# Patient Record
Sex: FEMALE | Race: BLACK OR AFRICAN AMERICAN
[De-identification: names, ages, dates, MRNs, and addresses within clinical notes are randomized per-mention and may not be internally consistent; named-entity substitution may affect disease eponyms.]

---

## 2017-05-23 ENCOUNTER — HOSPITAL ENCOUNTER (EMERGENCY)
Dept: HOSPITAL 62 - ER | Age: 37
Discharge: HOME | End: 2017-05-23
Payer: SELF-PAY

## 2017-05-23 VITALS — SYSTOLIC BLOOD PRESSURE: 126 MMHG | DIASTOLIC BLOOD PRESSURE: 67 MMHG

## 2017-05-23 DIAGNOSIS — R20.2: ICD-10-CM

## 2017-05-23 DIAGNOSIS — R51: ICD-10-CM

## 2017-05-23 DIAGNOSIS — R11.2: Primary | ICD-10-CM

## 2017-05-23 DIAGNOSIS — R20.0: ICD-10-CM

## 2017-05-23 DIAGNOSIS — F17.200: ICD-10-CM

## 2017-05-23 DIAGNOSIS — J45.909: ICD-10-CM

## 2017-05-23 DIAGNOSIS — M54.5: ICD-10-CM

## 2017-05-23 DIAGNOSIS — G89.29: ICD-10-CM

## 2017-05-23 DIAGNOSIS — R09.81: ICD-10-CM

## 2017-05-23 LAB
APPEARANCE UR: (no result)
BILIRUB UR QL STRIP: NEGATIVE
GLUCOSE UR STRIP-MCNC: NEGATIVE MG/DL
KETONES UR STRIP-MCNC: NEGATIVE MG/DL
NITRITE UR QL STRIP: NEGATIVE
PH UR STRIP: 8 [PH] (ref 5–9)
PROT UR STRIP-MCNC: NEGATIVE MG/DL
SP GR UR STRIP: 1.01
UROBILINOGEN UR-MCNC: NEGATIVE MG/DL (ref ?–2)

## 2017-05-23 PROCEDURE — S0119 ONDANSETRON 4 MG: HCPCS

## 2017-05-23 PROCEDURE — 99284 EMERGENCY DEPT VISIT MOD MDM: CPT

## 2017-05-23 PROCEDURE — 81001 URINALYSIS AUTO W/SCOPE: CPT

## 2017-05-23 PROCEDURE — 81025 URINE PREGNANCY TEST: CPT

## 2017-05-23 NOTE — ER DOCUMENT REPORT
ED General





- General


Mode of Arrival: Ambulatory


Information source: Patient


TRAVEL OUTSIDE OF THE [place] IN LAST 30 DAYS: No





- HPI


Onset: Other - Refer to HPI notes





- General


Chief Complaint: Nausea/Vomiting


Stated Complaint: BACK/HEAD PAIN


Notes: 


Patient is a 36 year old female presenting to the ED for nausea and headache. 

Patient states she woke up with these symptoms today. Patient complains of a 

headache on the left side of her head. Patient states she also has some runny 

nose, itchy eyes, congestion all of which are worse at night.  Patient has 

these symptoms recurrently each year consistent with seasonal allergies. 

Patient also complains of some nausea with vomiting but states she has not 

really vomited anything since she has not eaten today.  Patient states she has 

some numbness/tingling to the entirety of both legs. Patient also has a 

herniated disc and chronic back pain since 2009. Patient states her back pain 

has been worsening each year since her accident in 2009. Patient denies any 

vaginal, urinary, or bowel symptoms. Patient states she does not take anything 

for back pain. Patient has no known drug allergies. (JOHN ROBERTS)





- Related Data


Allergies/Adverse Reactions: 


 





No Known Drug Allergies Allergy (Verified 05/23/17 11:36)


 











Past Medical History





- General


Information source: Patient





- Social History


Smoking Status: Current Every Day Smoker


Chew tobacco use (# tins/day): No


Frequency of alcohol use: None


Drug Abuse: None


Family History: None


Patient has suicidal ideation: No


Patient has homicidal ideation: No


Pulmonary Medical History: Reports: Hx Asthma


GI Medical History: Reports: Hx Gastroesophageal Reflux Disease


Psychiatric Medical History: Reports: Hx Bipolar Disorder


Past Surgical History: Reports: Hx Orthopedic Surgery





- Immunizations


Immunizations up to date: No


Hx Diphtheria, Pertussis, Tetanus Vaccination: No - unknown last tetanus vaccine





Review of Systems





- Review of Systems


Constitutional: No symptoms reported


EENT: See HPI, Nose congestion, Nose discharge


Cardiovascular: No symptoms reported


Respiratory: No symptoms reported


Gastrointestinal: See HPI, Nausea, Vomiting


Genitourinary: No symptoms reported


Female Genitourinary: No symptoms reported


Musculoskeletal: See HPI, Back pain


Skin: No symptoms reported


Hematologic/Lymphatic: No symptoms reported


Neurological/Psychological: See HPI, Headaches


-: Yes All other systems reviewed and negative





Physical Exam





- Vital signs


Vitals: 


 











Temp Pulse Resp BP Pulse Ox


 


 98.2 F   73   16   118/70   100 


 


 05/23/17 11:40  05/23/17 11:40  05/23/17 11:40  05/23/17 11:40  05/23/17 11:40














- Notes


Notes: 


GENERAL: Alert, interacts well. No acute distress.


HEAD: Normocephalic, atraumatic.


EYES: Pupils equal, round, and reactive to light. Extraocular movements intact.


ENT: Oral mucosa moist, tongue midline. 


NECK: Full range of motion. Supple. Trachea midline.


LUNGS: Clear to auscultation bilaterally, no wheezes, rales, or rhonchi. No 

respiratory distress.


HEART: Regular rate and rhythm. No murmurs, gallops, or rubs.


ABDOMEN: Soft, non-tender. Non-distended. Bowel sounds present in all 4 

quadrants.


EXTREMITIES: Moves all 4 extremities spontaneously.  Normal straight leg test 

bilaterally.  Good strength and ROM bilaterally. No edema, radial and dorsalis 

pedis pulses 2/4 bilaterally. No cyanosis.


NEUROLOGICAL: Alert and oriented x3. Normal speech. 


PSYCH: Normal affect, normal mood.


SKIN: Warm, dry, normal turgor. No rashes or lesions noted.


 (JOHN ROBERTS)





Course





- Re-evaluation


Re-evalutation: 


05/23/17 13:46


UA shows small leukocyte esterase, but 16 squamous epithelial cells, likely a 

contaminated specimen.  This will be sent for culture, but not treated given 

minimal symptoms and we will call if the culture grows out true infection 

rather than contamination.





Patient's symptoms are all chronic with the exception new mild nausea without 

any true vomiting but only dry heaving.  Symptoms have resolved with Zofran.  

No evidence of true infection.  Patient will be discharged to home, encouraged 

to follow-up with primary care for provider as an outpatient.  Patient 

acknowledges that all of these other symptoms are old and has been going on for 

several months now.  Recommend patient take ibuprofen and acetaminophen.  Follow

-up with primary care provider, consider physical therapy, referred to Dr. Vasquez.





05/23/17 13:49


Recommended patient use antihistamine such as Claritin or Zyrtec also 

recommended the patient used nasal steroids.  Patient agrees to try the 

Claritin or Zyrtec, refuses to try steroids as she does not like spraying stuff 

up her nose.





05/23/17 20:43


 (RAY CROCKETT)





- Vital Signs


Vital signs: 


 











Temp Pulse Resp BP Pulse Ox


 


 98.1 F   62   18   126/67 H  100 


 


 05/23/17 14:01  05/23/17 14:01  05/23/17 14:01  05/23/17 14:01  05/23/17 14:01














- Laboratory


Laboratory results interpreted by me: 


 











  05/23/17





  12:41


 


Ur Leukocyte Esterase  SMALL H


 


Urine Ascorbic Acid  40 H














Discharge





- Discharge


Clinical Impression: 


 Nausea





Chronic low back pain


Qualifiers:


 Back pain laterality: midline Sciatica presence: without sciatica Qualified 

Code(s): M54.5 - Low back pain





Allergic rhinitis


Qualifiers:


 Allergic rhinitis trigger: pollen Allergic rhinitis seasonality: seasonal 

Qualified Code(s): J30.1 - Allergic rhinitis due to pollen





Condition: Stable


Disposition: HOME, SELF-CARE


Instructions:  Chronic Back Pain ([facility]), Stretching Exercises for the Back ([facility])

, Hay Fever ([facility])


Prescriptions: 


Loratadine [Claritin] 10 mg PO DAILY #30 tablet


Forms:  Return to Work


Scribe Attestation: 





05/23/17 20:43


I personally performed the services described in the documentation, reviewed 

and edited the documentation which was dictated to the scribe in my presence, 

and it accurately records my words and actions. (RAY CROCKETT)





Scribe Documentation





- Scribe


Written by Sandra:: Sandra Bhatia, 5/23/2017 1:00


acting as scribe for :: Donaldo

## 2018-05-23 ENCOUNTER — HOSPITAL ENCOUNTER (EMERGENCY)
Dept: HOSPITAL 62 - ER | Age: 38
Discharge: HOME | End: 2018-05-23
Payer: SELF-PAY

## 2018-05-23 VITALS — DIASTOLIC BLOOD PRESSURE: 54 MMHG | SYSTOLIC BLOOD PRESSURE: 102 MMHG

## 2018-05-23 DIAGNOSIS — J06.9: Primary | ICD-10-CM

## 2018-05-23 DIAGNOSIS — B97.89: ICD-10-CM

## 2018-05-23 DIAGNOSIS — F17.210: ICD-10-CM

## 2018-05-23 DIAGNOSIS — R09.89: ICD-10-CM

## 2018-05-23 DIAGNOSIS — J45.909: ICD-10-CM

## 2018-05-23 DIAGNOSIS — R05: ICD-10-CM

## 2018-05-23 DIAGNOSIS — J02.9: ICD-10-CM

## 2018-05-23 DIAGNOSIS — R51: ICD-10-CM

## 2018-05-23 DIAGNOSIS — R09.81: ICD-10-CM

## 2018-05-23 PROCEDURE — 99282 EMERGENCY DEPT VISIT SF MDM: CPT

## 2018-05-23 NOTE — ER DOCUMENT REPORT
ED ENT





- General


Chief Complaint: Sore Throat


Stated Complaint: SORE THROAT,COUGH,CONGESTION


Time Seen by Provider: 05/23/18 13:51


Mode of Arrival: Ambulatory


Information source: Patient


Notes: 





37-year-old female presents to ED for cough cold congestion sore throat 

yesterday.  She states she feels like everything is draining from her nose to 

her chest.  She states she has a history of asthma and she is over she might 

get a pneumonia.


TRAVEL OUTSIDE OF THE [place] IN LAST 30 DAYS: No





- HPI


Patient complains to provider of: Nose problem, Throat problem


Onset: Yesterday


Onset/Duration: Gradual


Quality of pain: Achy


Pain Level: 4


Context: Recent Illness


Location of pain: Nose, Sinus, Throat


Associated symptoms: Congestion, Cough, Runny nose, Sinus pain, Sinus drainage, 

Sore throat.  denies: Fever


Similar symptoms previously: Yes


Recently seen / treated by doctor: No





- Related Data


Allergies/Adverse Reactions: 


 





No Known Drug Allergies Allergy (Verified 05/23/17 11:36)


 











Past Medical History





- General


Information source: Patient





- Social History


Smoking Status: Current Some Day Smoker


Cigarette use (# per day): Yes


Chew tobacco use (# tins/day): No


Smoking Education Provided: Yes - 4 min


Frequency of alcohol use: Occasional


Drug Abuse: None


Occupation: Industry


Family History: None


Patient has suicidal ideation: No


Patient has homicidal ideation: No





- Past Medical History


Cardiac Medical History: Reports: None


Pulmonary Medical History: Reports: Hx Asthma


EENT Medical History: Reports: None


Neurological Medical History: Reports: None


Endocrine Medical History: Reports: None


Renal/ Medical History: Reports: None


Malignancy Medical History: Reports: None


GI Medical History: Reports: Hx Gastroesophageal Reflux Disease


Musculoskeltal Medical History: Reports Hx Musculoskeletal Trauma


Skin Medical History: Reports None


Psychiatric Medical History: Reports: Hx Bipolar Disorder


Traumatic Medical History: Reports: Hx Fractures - Jaw


Infectious Medical History: Reports: None


Past Surgical History: Reports: Hx Orthopedic Surgery





- Immunizations


Immunizations up to date: No


Hx Diphtheria, Pertussis, Tetanus Vaccination: No - unknown last tetanus vaccine





Review of Systems





- Review of Systems


Constitutional: Recent illness


EENT: Nose congestion, Nose discharge, Sinus pressure, Sinus discharge, Throat 

pain


Cardiovascular: No symptoms reported


Respiratory: Cough


Gastrointestinal: No symptoms reported


Genitourinary: No symptoms reported


Female Genitourinary: No symptoms reported


Musculoskeletal: No symptoms reported


Skin: No symptoms reported


Hematologic/Lymphatic: No symptoms reported


Neurological/Psychological: No symptoms reported


-: Yes All other systems reviewed and negative





Physical Exam





- Vital signs


Vitals: 





 











Temp Pulse Resp BP Pulse Ox


 


 99.1 F   93   18   102/54 L  96 


 


 05/23/18 13:04  05/23/18 13:04  05/23/18 13:04  05/23/18 13:04  05/23/18 13:04











Interpretation: Normal





- General


General appearance: Appears well, Alert





- HEENT


Head: Normocephalic, Atraumatic


Eyes: Normal


Pupils: PERRL


Ears: Normal


External canal: Normal


Tympanic membrane: Normal


Sinus: Normal


Nasal: Purulent discharge, Swelling


Mouth/Lips: Normal


Mucous membranes: Normal


Pharynx: Post nasal drainage.  No: Erythema, Exudate, Peritonsillar abscess, 

Retropharyngeal abscess, Tonsillar hypertrophy, Potential airway comprom.


Neck: Normal





- Respiratory


Respiratory status: No respiratory distress


Chest status: Nontender


Breath sounds: Nonproductive cough.  No: Productive cough, Rales, Rhonchi, 

Stridor, Wheezing


Chest palpation: Normal





- Cardiovascular


Rhythm: Regular


Heart sounds: Normal auscultation


Murmur: No





- Abdominal


Inspection: Normal


Distension: No distension


Bowel sounds: Normal


Tenderness: Nontender


Organomegaly: No organomegaly





- Back


Back: Normal, Nontender





- Extremities


General upper extremity: Normal inspection, Nontender, Normal color, Normal ROM

, Normal temperature


General lower extremity: Normal inspection, Nontender, Normal color, Normal ROM

, Normal temperature, Normal weight bearing.  No: Hilary's sign





- Neurological


Neuro grossly intact: Yes


Cognition: Normal


Orientation: AAOx4


[place] Coma Scale Eye Opening: Spontaneous


[place] Coma Scale Verbal: Oriented


[place] Coma Scale Motor: Obeys Commands


Ibis Coma Scale Total: 15


Speech: Normal


Motor strength normal: LUE, RUE, LLE, RLE


Sensory: Normal





- Psychological


Associated symptoms: Normal affect, Normal mood





- Skin


Skin Temperature: Warm


Skin Moisture: Dry


Skin Color: Normal





Course





- Re-evaluation


Re-evalutation: 





05/23/18 14:59


Patient was treated with Claritin and Sudafed and Decadron for her respiratory 

infection symptoms.  She does have a history of asthma so that is why she 

received a steroid.  She also received a prescription for albuterol.  After 

performing a Medical Screening Examination, I estimate there is LOW risk for 

ACUTE CORONARY SYNDROME, RESPIRATORY FAILURE, SEPSIS OR MENINGITIS, thus I 

consider the discharge disposition reasonable.  I have reevaluated this patient 

multiple times and no significant life threatening changes are noted. The 

patient and I have discussed the diagnosis and risks, and we agree with 

discharging home with close follow-up. We also discussed returning to the 

Emergency Department immediately if new or worsening symptoms occur. We have 

discussed the symptoms which are most concerning (e.g., changing or worsening 

pain, trouble swallowing or breathing, neck stiffness, fever) that necessitate 

immediate return.





- Vital Signs


Vital signs: 





 











Temp Pulse Resp BP Pulse Ox


 


 99.1 F   93   18   102/54 L  96 


 


 05/23/18 13:04  05/23/18 13:04  05/23/18 13:04  05/23/18 13:04  05/23/18 13:04














Discharge





- Discharge


Clinical Impression: 


 Viral sore throat





URI (upper respiratory infection)


Qualifiers:


 URI type: unspecified URI Qualified Code(s): J06.9 - Acute upper respiratory 

infection, unspecified





Condition: Stable


Disposition: HOME, SELF-CARE


Instructions:  Family Physicians / Practices


Additional Instructions: 


SORE THROAT:





     Sore throats may be caused by viruses, bacteria, or fungi.  Most are due 

to a virus, and must get better on their own.  Bacterial sore throats, 

particularly those due to "strep," need treatment with antibiotics.


     If an antibiotic is prescribed, be sure to take the medication for a full 

10 days.  Failure to take the antibiotic can result in complications such as 

rheumatic fever.  Sometimes, an injection of antibiotics is given instead of 

pills or liquid.  This single "shot" is equal in effectiveness to the oral 

medication.


     To relieve symptoms, take acetaminophen for pain.  Sip clear liquids 

frequently, or eat popsicles or ice chips.  Anesthetic sprays or lozenges may 

help.  Make sure the air in the room is not too dry. Avoid using decongestants 

or antihistamines.


     Call the doctor if there is no improvement in two days, or if you have 

difficulty breathing, increasing throat pain, high fever, rash, or frequent 

vomiting.





UPPER RESPIRATORY ILLNESS:





     You have a viral infection of the respiratory passages -- a "cold."  This 

common infection causes nasal congestion, drainage, and often sore throat and 

cough.  It is highly contagious.  The disease usually lasts about 10 to 14 days.


     There is no "cure" for the viral infection -- it must run its course.  If 

there is a complication, such as bacterial infection in the nose, sinuses, 

middle ear, or bronchial tubes, antibiotics may be required.  The antibiotics 

won't affect the virus.


     Drink plenty of fluids.  A humidifier may help.  An expectorant medication 

or decongestant may make you more comfortable.  Use acetaminophen or ibuprofen 

for fever or aches.


     See the doctor if fever persists over two days, if there is any 

significant worsening of your symptoms, or if you simply fail to improve as 

expected.





DECONGESTANT MEDICATION:


     A decongestant medicine has been prescribed.  Often this medicine is 

combined in the same tablet with an antihistamine or expectorant. This type of 

medicine is helpful in treating a bad cold or sinus condition, as well as in 

treatment of the nasal congestion of hay fever.  It is not of much benefit for 

lung infections.


     Decongestant medicines are related to stimulants.  They can cause an 

increase in blood pressure and heart rate.  Persons with heart disease and high 

blood pressure should not take decongestants without discussing this with the 

physician.


     If you develop palpitations, chest pain, headache, or tremors, stop the 

medicine and consult your physician.








COUGH-SUPPRESSANT & EXPECTORANT MEDICATION:


     You are to use a cough medication as needed for relief of symptoms.  This 

medicine is a combination of an expectorant (to make the mucous thinner and 

more easily "coughed up") and a cough suppressant (to reduce the frequency of 

coughing).


     The cough-suppressant medicine is related to narcotics.  You may 

experience mild nausea and sleepiness.  Some patients who are very sensitive to 

narcotics may have stomach pain from this medicine. Taking the medicine with 

food reduces these side effects.  Do not drive or work with machinery until you 

know how this medicine affects you.


     The expectorant should have no side effects.  Iodine-containing 

expectorants (such as organidin) should not be taken by persons with active 

thyroid disease unless approved by your doctor.


     Call the doctor if you develop shortness of breath, hives, rash, itching, 

lightheadedness, or severe nausea and vomiting.








INHALED BRONCHODILATORS:


     You have received a treatment of and/or prescription for an inhaled 

bronchodilator -- a medication which stimulates the airways in the lung to 

dilate.  This improves the flow of air in asthma, bronchitis, and emphysema.


     These medicines have some similarity to adrenaline, and can cause similar 

side effects:  shakiness, racing heart, and a sense of nervousness.  These side 

effects decrease with time.  Contact your doctor if these side effects are 

severe.


     Do not over-use the medicine.  Too-frequent use of the inhaler may make it 

ineffective.  Call your doctor if the inhaler is not controlling your symptoms 

at the prescribed doses.








STEROID MEDICATION:


     You have been given an injection of or oral medicine of the cortisone/

steroid class.  This medication is used to control inflammation or allergy.  

Sai t is usually only given for a short period of time, until the acute process 

subsides.


     There are usually no side effects from short-term use of cortisone-like 

medications.  Some persons feel an increased sense of well-being and are not 

sleepy at bedtime.  Long-term use of cortisone medications is best avoided, 

unless required for a severe condition.  If your condition does not remit, or 

relapses after the course of corticosteroid medication, you should consult your 

physician.








USE OF ACETAMINOPHEN (Tylenol):


     Acetaminophen may be taken for pain relief or fever control. It's much 

safer than aspirin, offering a wider range of "safe" dosages.  It is safe 

during pregnancy.  Some brand names are Tylenol, Panadol, Datril, Anacin 3, 

Tempra, and Liquiprin. Acetaminophen can be repeated every four hours.  The 

following are maximum recommended dosages:


>89 pounds or adults          650 mg to 900 mg


Acetaminophen can be repeated every four hours.  Maximum dose not to exceed 

4000 mg a day.





SMOKING:


     If you smoke, you should stop smoking.  The tar and chemicals in cigarette 

smoke are harmful.  Smoking has been shown to cause:


          emphysema


          chronic bronchitis


          lung cancer


          mouth and throat cancer


          stomach and pancreas cancer


          premature aging


          birth defects


     In addition, smoking increases ear and lung infections in children of 

smokers.





STEROID MEDICATION:


     You have been given a medicine of the cortisone/steroid class.  This 

medication is used to control inflammation or allergy.  It is usually only 

given for a short period of time, until the acute process subsides.


     There are usually no side effects from short-term use of cortisone-like 

medications.  Some persons feel an increased sense of well-being and are not 

sleepy at bedtime.  Long-term use of cortisone medications is best avoided, 

unless required for a severe condition.  If your condition does not remit, or 

relapses after the course of corticosteroid medication, you should consult your 

physician.








FOLLOW-UP CARE:


If you have been referred to a physician for follow-up care, call the physician

s office for an appointment as you were instructed or within the next two days.

  If you experience worsening or a significant change in your symptoms, notify 

the physician immediately or return to the Emergency Department at any time for 

re-evaluation.





Prescriptions: 


Albuterol Sulfate [Proair HFA Inhalation Aerosol 8.5 gm MDI] 2 puff IH Q4H PRN #

1 mdi


 PRN Reason: 


Forms:  Smoking Cessation Education, Return to Work

## 2018-07-27 ENCOUNTER — HOSPITAL ENCOUNTER (EMERGENCY)
Dept: HOSPITAL 62 - ER | Age: 38
Discharge: HOME | End: 2018-07-27
Payer: SELF-PAY

## 2018-07-27 VITALS — DIASTOLIC BLOOD PRESSURE: 68 MMHG | SYSTOLIC BLOOD PRESSURE: 120 MMHG

## 2018-07-27 DIAGNOSIS — K08.89: ICD-10-CM

## 2018-07-27 DIAGNOSIS — J45.909: ICD-10-CM

## 2018-07-27 DIAGNOSIS — K02.9: Primary | ICD-10-CM

## 2018-07-27 DIAGNOSIS — F17.200: ICD-10-CM

## 2018-07-27 PROCEDURE — 99282 EMERGENCY DEPT VISIT SF MDM: CPT

## 2018-07-27 NOTE — ER DOCUMENT REPORT
HPI





- HPI


Patient complains to provider of: Toothache


Onset: Other - Sunday


Onset/Duration: Gradual, Persistent


Pain Level: 5


Context: 





37-year-old female complaining of upper left first bicuspid toothache since 

Sunday.  No facial swelling.


Associated Symptoms: None


Exacerbated by: Denies


Relieved by: Denies


Similar symptoms previously: Yes


Recently seen / treated by doctor: No





- ROS


ROS below otherwise negative: Yes


Systems Reviewed and Negative: Yes All other systems reviewed and negative





- REPRODUCTIVE


Reproductive: DENIES: Pregnant:





Past Medical History





- General


Information source: Patient





- Social History


Smoking Status: Current Every Day Smoker


Frequency of alcohol use: None


Drug Abuse: None


Lives with: Family


Family History: None


Pulmonary Medical History: Reports: Hx Asthma


Renal/ Medical History: Denies: Hx Peritoneal Dialysis


GI Medical History: Reports: Hx Gastroesophageal Reflux Disease


Musculoskeletal Medical History: Reports Hx Musculoskeletal Trauma


Psychiatric Medical History: Reports: Hx Bipolar Disorder


Traumatic Medical History: Reports: Hx Fractures - Jaw


Past Surgical History: Reports: Hx Orthopedic Surgery





- Immunizations


Immunizations up to date: No


Hx Diphtheria, Pertussis, Tetanus Vaccination: No - unknown last tetanus vaccine





[facility] Provider Document





- CONSTITUTIONAL


Agree With Documented VS: Yes


Exam Limitations: No Limitations





- INFECTION CONTROL


TRAVEL OUTSIDE OF THE [place] IN LAST 30 DAYS: No





- HEENT


Notes: 





Dental decay with retracted gingiva to the first bicuspid upper left.  The 

other bicuspid and molars are gone on that side.  No abscess palpated.





- NECK


Neck: Supple.  negative: Lymphadenopathy-Left, Lymphadenopathy-Right





- NEURO


Level of Consciousness: Alert





Discharge





- Discharge


Clinical Impression: 


 Dental pain and decay





Condition: Good


Disposition: HOME, SELF-CARE


Instructions:  Acetaminophen, [facility], Ibuprofen (General) ([facility])

, Penicillin V K ([facility]), Toothache ([facility])


Additional Instructions: 


Lidocaine to numb the area


Penicillin to kill the bacteria in the mouth


Tylenol up to 4000 mg per day for pain


Ibuprofen 400 mg every 6 hours for pain


See the dentist


Prescriptions: 


Ibuprofen [Motrin 400 mg Tablet] 400 mg PO Q6HP PRN #30 tablet


 PRN Reason: 


Penicillin V Potassium [Penicillin Vk 500 mg Tablet] 500 mg PO QID #40 tablet

## 2019-03-01 ENCOUNTER — HOSPITAL ENCOUNTER (EMERGENCY)
Dept: HOSPITAL 62 - ER | Age: 39
Discharge: HOME | End: 2019-03-01
Payer: SELF-PAY

## 2019-03-01 VITALS — DIASTOLIC BLOOD PRESSURE: 65 MMHG | SYSTOLIC BLOOD PRESSURE: 126 MMHG

## 2019-03-01 DIAGNOSIS — R09.81: ICD-10-CM

## 2019-03-01 DIAGNOSIS — R06.7: ICD-10-CM

## 2019-03-01 DIAGNOSIS — R09.89: ICD-10-CM

## 2019-03-01 DIAGNOSIS — J45.901: ICD-10-CM

## 2019-03-01 DIAGNOSIS — R05: ICD-10-CM

## 2019-03-01 DIAGNOSIS — J06.9: Primary | ICD-10-CM

## 2019-03-01 DIAGNOSIS — F17.200: ICD-10-CM

## 2019-03-01 PROCEDURE — 94640 AIRWAY INHALATION TREATMENT: CPT

## 2019-03-01 PROCEDURE — 99283 EMERGENCY DEPT VISIT LOW MDM: CPT

## 2019-03-01 NOTE — ER DOCUMENT REPORT
ED General





- General


Chief Complaint: Cold Symptoms


Stated Complaint: COLD SYMPTOMS


Time Seen by Provider: 03/01/19 21:05


Mode of Arrival: Ambulatory


Information source: Patient


TRAVEL OUTSIDE OF THE [place] IN LAST 30 DAYS: No





- HPI


Patient complains to provider of: Bad cold, nasal congestion, runny nose, chest 

congestion, wheezing, coughin


Onset: Other - 2-3 days


Onset/Duration: Gradual, Persistent


Quality of pain: No pain


Severity: None


Associated symptoms: Nonproductive cough, Rhinnorhea.  denies: Chills, Diarrhea,

Fever, Nausea, Vomiting, Sore throat


Exacerbated by: Denies


Relieved by: Denies


Similar symptoms previously: No


Recently seen / treated by doctor: No


Notes: 





2-year-old -American female with presentation for bad cold including 

nasal congestion, runny nose, chest congestion, wheezing and coughing and 

sneezing.  Symptoms started a couple of days ago.  Patient is a smoker and is 

asthmatic.  Wheezing is worse.  No nausea vomiting or diarrhea





- Related Data


Allergies/Adverse Reactions: 


                                        





No Known Drug Allergies Allergy (Verified 05/23/17 11:36)


   











Past Medical History





- General


Information source: Patient





- Social History


Smoking Status: Current Every Day Smoker


Chew tobacco use (# tins/day): No


Frequency of alcohol use: None


Drug Abuse: None


Family History: None, Reviewed & Not Pertinent


Patient has suicidal ideation: No


Patient has homicidal ideation: No


Pulmonary Medical History: Reports: Hx Asthma


Renal/ Medical History: Denies: Hx Peritoneal Dialysis


GI Medical History: Reports: Hx Gastroesophageal Reflux Disease


Musculoskeletal Medical History: Reports Hx Musculoskeletal Trauma


Psychiatric Medical History: Reports: Hx Bipolar Disorder


Traumatic Medical History: Reports: Hx Fractures - Jaw


Past Surgical History: Reports: Hx Orthopedic Surgery





- Immunizations


Immunizations up to date: No


Hx Diphtheria, Pertussis, Tetanus Vaccination: No - unknown last tetanus vaccine





Review of Systems





- Review of Systems


Notes: 





Constitutional:  No fevers. No chills.





EENT: No eye redness. No eye pain. No ear pain. No sore throat.  Nasal and sinus

congestion





Cardiovascular:  No chest pain. No palpitations.





Respiratory: Harsh, dry cough. No shortness of breath. No respiratory distress. 

Positive wheezing





Gastrointestinal: No abdominal pain. No nausea, vomiting, or diarrhea.





Genitourinary: Atraumatic. No lesions. No pain. No discharge.





Musculoskeletal: Atraumatic. No swelling. No deformities.





Skin: No rash or lesions.





Lymphatic: No swollen lymph nodes.





Neurologic: No headache. No syncope.





Psychiatric: No suicidal or homicidal ideation.





Physical Exam





- Vital signs


Vitals: 





                                        











Temp Pulse Resp BP Pulse Ox


 


 99.3 F   82   16   130/79 H  100 


 


 03/01/19 19:28  03/01/19 19:28  03/01/19 19:28  03/01/19 19:28  03/01/19 19:28














- Notes


Notes: 





General: Well-developed, well-nourished. In no acute distress. Non-toxic 

appearing.





Cardiac: Well-perfused. Regular rate and rhythm. No murmurs, rubs, or gallops. 





Pulmonary: Bilateral wheezing.  No respiratory distress.





Abdominal: Non-distended. Non-rigid. Bowels sounds are present in all four 

quadrants. No guarding or rebound.





HEENT: Head is atraumatic. Conjunctivae not reddened. No tearing. PERRL. EOMI. 

Orbits atraumatic. No periorbital swelling or erythema. Oropharynx is without 

erythema, swelling, or exudates.





Neck: Supple. No adenopathy. No meningismus.





Dermatologic: Warm with good turgor. No rash. Atraumatic.





Chest: Atraumatic. No chest wall tenderness to palpation.





Musculoskeletal: Moves all extremities well. No range of motion deficits. no 

muscular or joint tenderness. No paraspinal muscle tenderness. no midline spinal

tenderness or step-off.





Genitourinary: Examination deferred





Neurologic: No gross neurologic deficits.





Psychiatric: Normal mood. 











Course





- Re-evaluation


Re-evalutation: 





03/01/19 22:08


Patient has improved after 3 consecutive DuoNeb's.  We will discharge her home 

with upper respiratory and asthma flare.





- Vital Signs


Vital signs: 





                                        











Temp Pulse Resp BP Pulse Ox


 


 99.3 F   82   16   130/79 H  100 


 


 03/01/19 19:28  03/01/19 19:28  03/01/19 19:28  03/01/19 19:28  03/01/19 19:28














Discharge





- Discharge


Clinical Impression: 


Upper respiratory infection


Qualifiers:


 URI type: unspecified URI Qualified Code(s): J06.9 - Acute upper respiratory 

infection, unspecified





Asthma exacerbation


Qualifiers:


 Asthma severity: unspecified severity Asthma persistence: unspecified Qualified

Code(s): J45.901 - Unspecified asthma with (acute) exacerbation





Condition: Good


Disposition: HOME, SELF-CARE


Instructions:  Upper Respiratory Illness ([facility]), Asthma ([facility])


Prescriptions: 


D-Methorphan Hb/P-Epd HCl/Bpm [Bromfed-DM Cough Syrup] 5 ml PO Q4HP PRN #150 ml


 PRN Reason: 


Albuterol Sulfate [Proair HFA Inhalation Aerosol 8.5 gm MDI] 2 puff IH Q4H PRN 

#1 mdi


 PRN Reason: 


Prednisone [Deltasone 20 mg Tablet] 3 tab PO DAILY 5 Days #15 tablet


Forms:  Smoking Cessation Education


Referrals: 


[facility] [Provider Group] - Follow up in 3-5 days

## 2019-10-02 ENCOUNTER — HOSPITAL ENCOUNTER (EMERGENCY)
Dept: HOSPITAL 62 - ER | Age: 39
Discharge: HOME | End: 2019-10-02
Payer: SELF-PAY

## 2019-10-02 VITALS — SYSTOLIC BLOOD PRESSURE: 107 MMHG | DIASTOLIC BLOOD PRESSURE: 73 MMHG

## 2019-10-02 DIAGNOSIS — F17.200: ICD-10-CM

## 2019-10-02 DIAGNOSIS — J20.9: Primary | ICD-10-CM

## 2019-10-02 DIAGNOSIS — B96.89: ICD-10-CM

## 2019-10-02 DIAGNOSIS — J34.89: ICD-10-CM

## 2019-10-02 DIAGNOSIS — R06.7: ICD-10-CM

## 2019-10-02 DIAGNOSIS — R05: ICD-10-CM

## 2019-10-02 DIAGNOSIS — J45.901: ICD-10-CM

## 2019-10-02 PROCEDURE — 99283 EMERGENCY DEPT VISIT LOW MDM: CPT

## 2019-10-02 PROCEDURE — 94640 AIRWAY INHALATION TREATMENT: CPT

## 2019-10-02 PROCEDURE — 71046 X-RAY EXAM CHEST 2 VIEWS: CPT

## 2019-10-02 NOTE — RADIOLOGY REPORT (SQ)
EXAM DESCRIPTION:  CHEST 2 VIEWS



COMPLETED DATE/TIME:  10/2/2019 10:26 am



REASON FOR STUDY:  cough



COMPARISON:  3/17/2015



EXAM PARAMETERS:  NUMBER OF VIEWS: two views

TECHNIQUE: Digital Frontal and Lateral radiographic views of the chest acquired.

RADIATION DOSE: NA

LIMITATIONS: none



FINDINGS:  LUNGS AND PLEURA: No opacities, masses or pneumothorax. No pleural effusion.

MEDIASTINUM AND HILAR STRUCTURES: No masses or contour abnormalities.

HEART AND VASCULAR STRUCTURES: Heart normal size.  No evidence for failure.

BONES: No acute findings.

HARDWARE: None in the chest.

OTHER: No other significant finding.



IMPRESSION:  NO ACUTE RADIOGRAPHIC FINDING IN THE CHEST.



TECHNICAL DOCUMENTATION:  JOB ID:  [id number]

 2011 [employer]- All Rights Reserved



Reading location - IP/workstation name: [email or web address]

## 2019-10-02 NOTE — ER DOCUMENT REPORT
ED Respiratory Problem





- General


Chief Complaint: Cold Symptoms


Stated Complaint: COUGH


Time Seen by Provider: 10/02/19 09:04


TRAVEL OUTSIDE OF THE [place] IN LAST 30 DAYS: No





- HPI


Notes: 





This is a 38-year-old female who presents today with a complaint of cough, 

congestion, sneezing, rhinorrhea for the past 2 days.  Patient states that she 

thinks is because of the head cold and Does not wanted to get any worse.  She 

has a history of asthma which is usually triggered by changes in weather.  She 

describes her cough is productive of clear sputum.  She denies any fever or 

chills.  She denies any chest pain.  There are no obvious aggravating or 

relieving factors.





- Related Data


Allergies/Adverse Reactions: 


                                        





No Known Drug Allergies Allergy (Verified 05/23/17 11:36)


   











Past Medical History





- Social History


Smoking Status: Current Some Day Smoker


Chew tobacco use (# tins/day): No


Frequency of alcohol use: None


Drug Abuse: None


Family History: None, Reviewed & Not Pertinent


Patient has suicidal ideation: No


Patient has homicidal ideation: No


Pulmonary Medical History: Reports: Hx Asthma


Renal/ Medical History: Denies: Hx Peritoneal Dialysis


GI Medical History: Reports: Hx Gastroesophageal Reflux Disease


Musculoskeletal Medical History: Reports Hx Musculoskeletal Trauma


Psychiatric Medical History: Reports: Hx Bipolar Disorder


Traumatic Medical History: Reports: Hx Fractures - Jaw


Past Surgical History: Reports: Hx Orthopedic Surgery





- Immunizations


Immunizations up to date: No


Hx Diphtheria, Pertussis, Tetanus Vaccination: No - unknown last tetanus vaccine





Review of Systems





- Review of Systems


Constitutional: denies: Fever


EENT: Nose congestion, Sinus pressure


Cardiovascular: denies: Chest pain, Palpitations


Respiratory: Cough, Sputum, Wheezing


-: Yes All other systems reviewed and negative





Physical Exam





- Vital signs


Vitals: 


                                        











Temp Pulse Resp BP Pulse Ox


 


 99.1 F   77   16   107/73   100 


 


 10/02/19 08:23  10/02/19 08:23  10/02/19 08:23  10/02/19 08:23  10/02/19 08:23














- General


General appearance: Appears well, Alert





- Respiratory


Respiratory status: No respiratory distress


Chest status: Nontender


Breath sounds: Rhonchi, Wheezing - Scattered wheezes and rhonchi appreciated.


Chest palpation: Normal





- Cardiovascular


Rhythm: Regular


Heart sounds: Normal auscultation


Murmur: No





- Abdominal


Inspection: Normal


Distension: No distension


Bowel sounds: Normal


Tenderness: Nontender


Organomegaly: No organomegaly





- Neurological


Neuro grossly intact: Yes


Cognition: Normal


Orientation: AAOx4


[place] Coma Scale Eye Opening: Spontaneous


[place] Coma Scale Verbal: Oriented


Ibis Coma Scale Motor: Obeys Commands


Ibis Coma Scale Total: 15


Speech: Normal


Motor strength normal: LUE, RUE, LLE, RLE


Sensory: Normal





- Psychological


Associated symptoms: Normal affect, Normal mood





- Skin


Skin Temperature: Warm


Skin Moisture: Dry


Skin Color: Normal





Course





- Re-evaluation


Re-evalutation: 





10/02/19 09:31


Differential diagnosis includes bronchitis versus asthma exacerbation versus 

pneumonia.


10/02/19 10:56


Patient reevaluated.  Patient is doing well.  No wheezing.  She is stable for 

discharge.  Chest x-ray negative.  Given rhonchi on exam, I will put her on a Z-

Karthik for bronchitis.  Follow-up discussed with patient.





- Vital Signs


Vital signs: 


                                        











Temp Pulse Resp BP Pulse Ox


 


 99.1 F   77   16   107/73   100 


 


 10/02/19 08:23  10/02/19 08:23  10/02/19 08:23  10/02/19 08:23  10/02/19 08:23














- Diagnostic Test


Radiology reviewed: Reports reviewed





Discharge





- Discharge


Clinical Impression: 


 Acute bacterial bronchitis





Asthma with exacerbation


Qualifiers:


 Asthma severity: mild Asthma persistence: unspecified Qualified Code(s): 

J45.901 - Unspecified asthma with (acute) exacerbation





Condition: Good


Disposition: HOME, SELF-CARE


Instructions:  Upper Respiratory Illness ([facility]), Asthma ([facility])


Prescriptions: 


Prednisone [Deltasone 20 mg Tablet] 2 tab PO DAILY 5 Days #10 tablet


Albuterol Sulfate [Proair HFA Inhalation Aerosol 8.5 gm MDI] 2 puff IH Q4H PRN 

#1 mdi


 PRN Reason: 


Azithromycin [Zithromax 250 mg Tablet] 250 mg PO ASDIR PRN #6 tablet


 PRN Reason: 


Forms:  Return to Work


Referrals: 


[facility] [NO LOCAL MD] - Follow up as needed

## 2020-03-28 ENCOUNTER — HOSPITAL ENCOUNTER (OUTPATIENT)
Dept: HOSPITAL 62 - RDC | Age: 40
End: 2020-03-28
Attending: NURSE PRACTITIONER
Payer: SELF-PAY

## 2020-03-28 VITALS — SYSTOLIC BLOOD PRESSURE: 121 MMHG | DIASTOLIC BLOOD PRESSURE: 61 MMHG

## 2020-03-28 DIAGNOSIS — Z20.828: Primary | ICD-10-CM

## 2020-03-28 LAB
A TYPE INFLUENZA AG: POSITIVE
B INFLUENZA AG: NEGATIVE

## 2020-03-28 PROCEDURE — 87804 INFLUENZA ASSAY W/OPTIC: CPT

## 2020-03-28 PROCEDURE — 87070 CULTURE OTHR SPECIMN AEROBIC: CPT

## 2020-03-28 PROCEDURE — 87880 STREP A ASSAY W/OPTIC: CPT

## 2020-03-28 NOTE — ER RDC ASSESSMENT REPORT
Intake





- In the Last 14 days


Have you traveled outside [place]?: No


Have you been in close contact with someone CONFIRMED: No


Worked in Healthcare?: No





- Symptoms


Subjective Fever(Felt feverish): No


Chills: No


Muscule Aches: No


Runny Nose: Yes


Sore Throat: No


Cough (New or worsening chronic cough): Yes


Shortness of breath: Yes


Nausea or Vomiting: No


Headache: Yes


Abdominal Pain: No


Diarrhea(3 or more loose stools in last 24 hours): No





- Do you have any of the following


Chronic lung disease: Asthma or emphysema or COPD: Yes


Chronic Lung Disease Comment: 


Asthma


Cystic Fibrosis: No


Diabetes: No


High Blood Pressure: No


Cardiovascular Disease: No


Chronic Kidney Disease: No


Chronic Liver Disease: No


Chronic blood disorder like Sickle Cell Disease: No


Weak immune system due to disease or medication: No


Neurologic condition that limits movement: No


Developmental delay - Moderate to Severe: No


Recent (within past 2 weeks) or current Pregnancy: No





- Objective


Temperature: 98.8 F


Pulse Rate: 97


Respiratory Rate: 16


Blood Pressure: 121/61


O2 Sat by Pulse Oximetry: 96


Objective: 


Patient presents for COVID testing.





General





- General


Stated Complaint: Patient reports upper respiratory symptoms that began 

03/23/2020


Mode of Arrival: Ambulatory


Information source: Patient





- HPI


Onset: This morning





- Related Data


Allergies/Adverse Reactions: 


                                        





No Known Drug Allergies Allergy (Verified 05/23/17 11:36)


   











Past Medical History





- Social History


Smoking Status: Current Every Day Smoker


Smoking Education Provided: Yes - Education on smoking cessation provided


Family History: None, Reviewed & Not Pertinent


Pulmonary Medical History: Reports: Hx Asthma


Renal/ Medical History: Denies: Hx Peritoneal Dialysis


GI Medical History: Reports: Hx Gastroesophageal Reflux Disease


Musculoskeletal Medical History: Reports Hx Musculoskeletal Trauma


Psychiatric Medical History: Reports: Hx Bipolar Disorder


Traumatic Medical History: Reports: Hx Fractures - Jaw


Past Surgical History: Reports: Hx Orthopedic Surgery





Physical Exam





- General


Notes: 


PHYSICAL EXAMINATION: 


GENERAL: Well-appearing and in no acute distress. 


HEAD: Atraumatic, normocephalic. 


EYES: sclera anicteric, conjunctiva are normal. 


ENT: nares patent. Moist mucous membranes. 


NECK: Normal range of motion, supple without lymphadenopathy 


LUNGS: CTAB and equal. No wheezes rales or rhonchi. 


HEART: Regular rate and rhythm without murmurs 


NEUROLOGICAL: Normal speech.


PSYCH: Normal mood, normal affect. 


SKIN: Warm, Dry, normal turgor, no obvious rashes or lesions noted








Diagnostic Results


Laboratory Results: 


Patient notified of POSITIVE result for Influenza A. 


Also notified of NEGATIVE result for Influenza B and Rapid Strep testing, as 

well as PENDING results for Strep Culture. 





Patient Education/Counseling


Counseling/Education: 


Patient presents with upper respiratory symptoms worrisome for possible Covid 

19.  Patient does not have emergency worring symptoms such as difficulty 

breathing, shortness of breath, chest pain, pressure, confusion or cyanosis.  

Patient appears suitable for discharge.  Patient's vital signs are stable and 

patient is nontoxic in appearance.  Good return precautions have been discussed 

with patient, patient verbalized understanding and is agreeable with discharge 

plan of care at this time.





Patient provided with COVID discharge instructions including:





As a person under investigation for Covid 19, the [facility] of 

Health and Human Services, division of public health advises you to adhere to 

the following guidance until your test results are reported to you.  If your 

test result is positive, you will receive additional information from your 

provider and your local health department at that time.


 


Remain at home until you are cleared by the health provider or public health 

authorities.


 


Keep a log of visitors to your home, notify any visitors to your home of your 

isolation status.


 


If you plan to move to a new address or leave the county, notify the local 

health department in your County.


 


Call your doctor or seek care if you have an urgent medical need.  Before 

seeking medical care, call ahead to get instructions from the provider before 

arriving at the medical office clinic or hospital.  Notify them that you are 

being tested for the virus that causes Covid 19 so that arrangements can be 

made, as necessary, to prevent transmission to others in the healthcare setting.

 Next, notify the local health department in your county.


 


If a medical emergency arises and you need to call 911, inform the first 

responders that you are being tested for the virus that causes Covid 19.  Next, 

notify the local health department in your county.





Patient advised due to onset of symptoms greater than 72 hours, symptomatic 

treatment includes the following:


- Use OTC acetaminophen (follow directions by ) for elevated 

temperature above 101 degrees


- Increase fluid intake to promote hydration


- Consume healthy diet as tolerated


- Activity as tolerated with periods of rest as necessary


- Encouraged smoking cessation





Guidance for worsening S/SX: 


For increased or worsening symptoms patient was instructed to contact her 

primary care provider.  In the presence of life-threatening symptoms, patient 

instructed to immediately call 911, and go to the closest Emergency Department.





RDC Discharge





- Discharge


Clinical Impression: 


 Influenza A, COVID 19 Screening





Condition: Stable


Disposition: Home; Selfcare

## 2020-04-28 ENCOUNTER — HOSPITAL ENCOUNTER (EMERGENCY)
Dept: HOSPITAL 62 - ER | Age: 40
Discharge: HOME | End: 2020-04-28
Payer: SELF-PAY

## 2020-04-28 VITALS — DIASTOLIC BLOOD PRESSURE: 65 MMHG | SYSTOLIC BLOOD PRESSURE: 121 MMHG

## 2020-04-28 DIAGNOSIS — K08.9: Primary | ICD-10-CM

## 2020-04-28 PROCEDURE — 99282 EMERGENCY DEPT VISIT SF MDM: CPT

## 2020-04-28 NOTE — ER DOCUMENT REPORT
HPI





- HPI


Patient complains to provider of: dental pain


Time Seen by Provider: 04/28/20 10:32


Onset: Last week


Onset/Duration: Sudden


Quality of pain: Achy


Pain Level: 3


Context: 





39-year-old female presents emergency department with complaints of right upper 

dental pain radiating to her ear to her jaw to the back of her neck.  She denies

fever vomiting diarrhea.  Denies sensitivity to heat or cold.  Reports symptoms 

for the past week.  Patient reports she is taken a Tylenol Sinus without relief 

of symptoms.


Associated Symptoms: None


Exacerbated by: Denies


Relieved by: Denies


Similar symptoms previously: No


Recently seen / treated by doctor: No





- CONSTITUTIONAL


Constitutional: DENIES: Fever, Chills





- EENT


EENT: REPORTS: Ear Pain.  DENIES: Sore Throat, Eye problems





- REPRODUCTIVE


LMP: 4/28/2020


Reproductive: DENIES: Pregnant:





Past Medical History





- General


Information source: Patient


Last Menstrual Period: today





- Social History


Smoking Status: Current Every Day Smoker


Chew tobacco use (# tins/day): No


Frequency of alcohol use: None


Drug Abuse: None


Family History: None, Reviewed & Not Pertinent


Patient has suicidal ideation: No


Patient has homicidal ideation: No


Pulmonary Medical History: Reports: Hx Asthma


Renal/ Medical History: Denies: Hx Peritoneal Dialysis


GI Medical History: Reports: Hx Gastroesophageal Reflux Disease


Musculoskeletal Medical History: Reports Hx Musculoskeletal Trauma


Psychiatric Medical History: Reports: Hx Bipolar Disorder


Traumatic Medical History: Reports: Hx Fractures - Jaw


Past Surgical History: Reports: Hx Oral Surgery, Hx Orthopedic Surgery





- Immunizations


Immunizations up to date: No


Hx Diphtheria, Pertussis, Tetanus Vaccination: No - unknown last tetanus vaccine





Vertical Provider Document





- CONSTITUTIONAL


Agree With Documented VS: Yes


Exam Limitations: No Limitations


General Appearance: WD/WN, No Apparent Distress





- INFECTION CONTROL


TRAVEL OUTSIDE OF THE [place] IN LAST 30 DAYS: No





- HEENT


HEENT: Atraumatic, Normal ENT Exam.  negative: Conjuctival Injection, Pharyngeal

Erythema, Tympanic Membrane Bulging


Mouth Diagram: 


                            __________________________














                            __________________________





 1 - c/o pain, opens mouth wide clear voice no trismus no erythema no swelling 

no pustule, no Gregorio








- NECK


Neck: Normal Inspection, Supple.  negative: Lymphadenopathy-Left, 

Lymphadenopathy-Right





- RESPIRATORY


Respiratory: Breath Sounds Normal, No Respiratory Distress





- CARDIOVASCULAR


Cardiovascular: Regular Rate





- MUSCULOSKELETAL/EXTREMETIES


Musculoskeletal/Extremeties: MAEW, FROM





- NEURO


Level of Consciousness: Awake, Alert, Appropriate


Motor/Sensory: No Motor Deficit





- DERM


Integumentary: Warm, Dry





Course





- Re-evaluation


Re-evalutation: 





04/28/20 11:06


Patient was instructed on over-the-counter decongestant.  Also instructed on 

Pen-Vee K.  Instructed on the importance of follow-up with a dentist or return 

to the emergency department for worsening symptoms or concerns.  She verbalized 

understanding to all instructions.





- Vital Signs


Vital signs: 


                                        











Temp Pulse Resp BP Pulse Ox


 


 98.3 F   72   16   121/65   100 


 


 04/28/20 10:34  04/28/20 10:32  04/28/20 10:32  04/28/20 10:32  04/28/20 10:32














Discharge





- Discharge


Clinical Impression: 


 Pain, dental





Condition: Stable


Disposition: HOME, SELF-CARE


Instructions:  Dentist, Use of Over-The-Counter Ibuprofen ([facility]), Penicillin V K 

([facility]), Toothache ([facility])


Additional Instructions: 


*You have been evaluated for dental pain


*Take medications as prescribed


*Follow up with dentist as soon as possible


*Return to ED for worsening condition, changes, needs


Prescriptions: 


Penicillin V Potassium [Penicillin Vk 500 mg Tablet] 500 mg PO BID #20 tablet


Forms:  Smoking Cessation Education

## 2020-05-07 ENCOUNTER — HOSPITAL ENCOUNTER (EMERGENCY)
Dept: HOSPITAL 62 - ER | Age: 40
Discharge: HOME | End: 2020-05-07
Payer: SELF-PAY

## 2020-05-07 VITALS — SYSTOLIC BLOOD PRESSURE: 107 MMHG | DIASTOLIC BLOOD PRESSURE: 63 MMHG

## 2020-05-07 DIAGNOSIS — J45.909: ICD-10-CM

## 2020-05-07 DIAGNOSIS — J34.89: Primary | ICD-10-CM

## 2020-05-07 DIAGNOSIS — K08.89: ICD-10-CM

## 2020-05-07 PROCEDURE — 99283 EMERGENCY DEPT VISIT LOW MDM: CPT

## 2020-05-07 NOTE — ER DOCUMENT REPORT
ED [facility]





- General


Chief Complaint: Sinus Pain


Stated Complaint: SINUS PAIN


Time Seen by Provider: 05/07/20 08:52


Notes: 





CHIEF COMPLAINT: Continued sinus discomfort





HPI: 39-year-old female presenting back to the emergency department for reevalua

tion of continued right sinus discomfort.  No fever.  Patient states she was 

seen approximately a week ago placed on penicillin for dental pain and sinus 

pain.  Did not follow-up with a dentist or PCP.  Now presenting again for 

reevaluation of continued discomfort to the right face.  No rash.  No visual 

change or loss.  States that she does feel slightly better after starting on the

penicillin, has been using over-the-counter Sudafed.  Has not been taking any 

other medications.  Has not had facial swelling.





ROS: See HPI - all other systems were reviewed and are otherwise negative


Constitutional: no fever 


Eyes: no drainage, no blurred vision


ENT: no runny nose, no sore throat, right facial pain positive


Cardiovascular:  no chest pain 


Resp: no SOB, no cough


GI: no vomiting, no diarrhea, no abdominal pain


: no dysuria


Integumentary: no rash 


Allergy: no hives 


Musculoskeletal: no extremity pain or swelling 


Neurological: no numbness/tingling, no weakness no visual loss





MEDICATIONS: I agree with the patient medications as charted by the RN.





ALLERGIES: I agree with the allergies as charted by the RN.





PAST MEDICAL HISTORY/PAST SURGICAL HISTORY: Reviewed and agree as charted by RN.





SOCIAL HISTORY: Reviewed and agree as charted by RN.





FAMILY HISTORY: No significant familial comorbid conditions directly related to 

patient complaint





EXAM:


Reviewed vital signs as charted by RN.


CONSTITUTIONAL: Alert and oriented and responds appropriately to questions. 

Well-appearing; well-nourished


HEAD: Normocephalic; atraumatic


EYES: PERRL; Conjunctivae clear, sclerae non-icteric


ENT: normal nose; no rhinorrhea; moist mucous membranes; pharynx without lesions

noted, no uvula edema or deviation, no tonsillar hypertrophy, phonation normal. 

Minimal tenderness over the right maxillary sinus on palpation no visible facial

swelling no trismus


NECK: Supple without meningismus; non-tender; no cervical lymphadenopathy, no 

masses


CARD: Capillary refill less than 3 seconds; symmetric distal pulses


RESP: Normal chest excursion without splinting or tachypnea


ABD/GI: Normal bowel sounds; non-distended; soft, non-tender, no rebound, no 

guarding; no palpable organomegaly or masses.


BACK:  The back appears normal and is non-tender to palpation, there is no CVA 

tenderness


EXT: Normal ROM in all joints; non-tender to palpation; no cyanosis, no 

effusions, no edema   


SKIN: Normal color for age and race; warm; dry; good turgor; no acute lesions 

noted


NEURO: Moves all extremities equally; Motor and sensory function intact 


PSYCH: The patient's mood and manner are appropriate. Grooming and personal 

hygiene are appropriate.





MDM: 39-year-old female requesting to be treated for sinus issues.  She is on 

penicillin currently.  She is on Sudafed currently.  Patient states that her 

symptoms are typical of what she considers to be a sinus infection.  I did 

discuss this at length with her I did offer additional testing to make sure that

patient is being treated appropriately including lab work or CT imaging patient 

declined stating she just wishes further treatment for the sinus issues she 

believes she has.  She has no facial swelling or fever she is on penicillin 

currently.  I will add a short course of steroids and a nasal steroid as well to

help with her discomfort and symptoms.  She is to follow-up with a primary care 

provider for reevaluation return for fever greater than 101 or facial swelling


TRAVEL OUTSIDE OF THE [place] IN LAST 30 DAYS: No





- Related Data


Allergies/Adverse Reactions: 


                                        





No Known Drug Allergies Allergy (Verified 05/23/17 11:36)


   











Past Medical History





- Social History


Smoking Status: Unknown if Ever Smoked


Family History: None, Reviewed & Not Pertinent


Pulmonary Medical History: Reports: Hx Asthma


Renal/ Medical History: Denies: Hx Peritoneal Dialysis


GI Medical History: Reports: Hx Gastroesophageal Reflux Disease


Musculoskeletal Medical History: Reports Hx Musculoskeletal Trauma


Psychiatric Medical History: Reports: Hx Bipolar Disorder


Traumatic Medical History: Reports: Hx Fractures - Jaw


Past Surgical History: Reports: Hx Oral Surgery, Hx Orthopedic Surgery





- Immunizations


Immunizations up to date: No


Hx Diphtheria, Pertussis, Tetanus Vaccination: No - unknown last tetanus vaccine





Physical Exam





- Vital signs


Vitals: 





                                        











Temp Pulse Resp BP Pulse Ox


 


 98.6 F   84   16   107/63   100 


 


 05/07/20 08:31  05/07/20 08:31  05/07/20 08:31  05/07/20 08:31  05/07/20 08:31














Course





- Vital Signs


Vital signs: 





                                        











Temp Pulse Resp BP Pulse Ox


 


 98.6 F   84   16   107/63   100 


 


 05/07/20 08:31  05/07/20 08:31  05/07/20 08:31  05/07/20 08:31  05/07/20 08:31














Discharge





- Discharge


Clinical Impression: 


 Sinus pain





Condition: Stable


Disposition: HOME, SELF-CARE


Instructions:  Sinusitis ([facility])


Additional Instructions: 


Continue to take Sudafed and your antibiotics as previously prescribed.  Use the

nasal steroid as prescribed.  Take the Decadron as prescribed.  Follow-up with a

primary care provider for reevaluation of your symptoms if they continue call 

for appointment within the next 2 to 3 days.  If you develop facial swelling or 

fever greater than 101 return to the emergency department


Prescriptions: 


Dexamethasone [Decadron] 4 mg PO QAM #7 tablet


Fluticasone Propionate [Flonase Nasal Spray 50 Mcg/Spray 16 gm] 1 spray NASL Q12

#1 inhaler


Forms:  Return to Work


Referrals: 


EMI NUGYEN MD [[employer] [employer]] - Follow up as needed